# Patient Record
Sex: FEMALE | Race: WHITE | NOT HISPANIC OR LATINO | Employment: OTHER | URBAN - METROPOLITAN AREA
[De-identification: names, ages, dates, MRNs, and addresses within clinical notes are randomized per-mention and may not be internally consistent; named-entity substitution may affect disease eponyms.]

---

## 2023-10-19 DIAGNOSIS — Z83.79 FAMILY HISTORY OF CELIAC DISEASE: Primary | ICD-10-CM

## 2024-08-16 ENCOUNTER — OFFICE VISIT (OUTPATIENT)
Dept: OBSTETRICS AND GYNECOLOGY | Facility: CLINIC | Age: 49
End: 2024-08-16
Payer: COMMERCIAL

## 2024-08-16 DIAGNOSIS — N93.9 ABNORMAL UTERINE BLEEDING (AUB): ICD-10-CM

## 2024-08-16 DIAGNOSIS — R10.2 PELVIC PAIN IN FEMALE: ICD-10-CM

## 2024-08-16 DIAGNOSIS — Z12.31 ENCOUNTER FOR SCREENING MAMMOGRAM FOR BREAST CANCER: ICD-10-CM

## 2024-08-16 DIAGNOSIS — Z01.419 ENCOUNTER FOR ANNUAL ROUTINE GYNECOLOGICAL EXAMINATION: Primary | ICD-10-CM

## 2024-08-16 LAB
POC BLOOD, URINE: NEGATIVE
POC GLUCOSE, URINE: NEGATIVE MG/DL
POC KETONES, URINE: NEGATIVE MG/DL
POC LEUKOCYTES, URINE: NEGATIVE
POC NITRITE,URINE: NEGATIVE
POC PROTEIN, URINE: NEGATIVE MG/DL

## 2024-08-16 PROCEDURE — 99386 PREV VISIT NEW AGE 40-64: CPT | Performed by: OBSTETRICS & GYNECOLOGY

## 2024-08-16 PROCEDURE — 81003 URINALYSIS AUTO W/O SCOPE: CPT | Mod: QW | Performed by: OBSTETRICS & GYNECOLOGY

## 2024-08-16 ASSESSMENT — ENCOUNTER SYMPTOMS
FATIGUE: 1
PALPITATIONS: 1
OCCASIONAL FEELINGS OF UNSTEADINESS: 0
SINUS PRESSURE: 1
EYES NEGATIVE: 0
DIFFICULTY URINATING: 1
LOSS OF SENSATION IN FEET: 0
HEMATOLOGIC/LYMPHATIC NEGATIVE: 0
ENDOCRINE NEGATIVE: 0
MUSCULOSKELETAL NEGATIVE: 0
GASTROINTESTINAL NEGATIVE: 0
PSYCHIATRIC NEGATIVE: 0
CARDIOVASCULAR NEGATIVE: 0
DEPRESSION: 0
CONSTITUTIONAL NEGATIVE: 0
ALLERGIC/IMMUNOLOGIC NEGATIVE: 0
NEUROLOGICAL NEGATIVE: 0
RESPIRATORY NEGATIVE: 0

## 2024-08-16 ASSESSMENT — LIFESTYLE VARIABLES
HOW MANY STANDARD DRINKS CONTAINING ALCOHOL DO YOU HAVE ON A TYPICAL DAY: PATIENT DOES NOT DRINK
SKIP TO QUESTIONS 9-10: 1
HOW OFTEN DO YOU HAVE SIX OR MORE DRINKS ON ONE OCCASION: NEVER
HOW OFTEN DO YOU HAVE A DRINK CONTAINING ALCOHOL: NEVER
AUDIT-C TOTAL SCORE: 0

## 2024-08-16 NOTE — PROGRESS NOTES
Subjective   Patient ID: Chica Cavazos is a 48 y.o. female who presents for New Patient Visit (New patient visit/annual/heavy periods/pelvic floor issues 5/22/15 wnl last mammogram 22 benign).  HPI  Patient is a 48-year-old female  2 para 2 known to the practice from .  Now here to reestablish care.  She complains of increasing menstrual flow.  Still coming monthly lasting 10 days with 3 to 4 days of heavy flow with clots difficult to manage.    Also complaining of some urinary urgency and difficulty releasing her urine on occasion.    She did reach out to an online medical site and is using a vaginal estradiol 2-3 times per week.  Also using transdermal cream of biased and progesterone daily.    She denies any bowel symptoms.  Review of Systems   Constitutional:  Positive for fatigue.   HENT:  Positive for sinus pressure.    Cardiovascular:  Positive for palpitations.   Genitourinary:  Positive for difficulty urinating, menstrual problem, pelvic pain and urgency.   Hot flashes    Objective   Physical Exam  Thyroid: No thyroid megaly    Cardiovascular: Regular rate and rhythm    Lungs: Clear to auscultation    Breasts: No skin changes no masses palpated    Abdomen: Soft nontender bowel sounds positive no masses palpated    Extremities nontender no edema    Pelvic exam: External genitalia [place]'s urethra and [place]'s are normal.  Vaginal exam shows no lesions or discharge.  Pelvic bimanual exam reveals uterus is slightly bulky 6 weeks size no adnexal masses.  Pelvic floor tenderness.  Assessment/Plan   Heavy menstrual flow.  Will check CBC, FSH, ultrasound.  Follow-up in 2 weeks to review results.  Did review possible solutions such as hormonal control or IUD, D&C ablation, hysterectomy, will discuss further at her follow-up appointment    Pelvic floor pain and urinary urgency was also difficult delayed micturition    Pap smear performed    Mammogram ordered    She will continue her vaginal  estrogen for urinary symptoms.  She continues her transdermal combined hormone replacement prescribed from outside agency and will review at her follow-up visit.         Albaro Rodriguez MD 08/16/24 10:43 AM

## 2024-08-22 ENCOUNTER — HOSPITAL ENCOUNTER (OUTPATIENT)
Dept: RADIOLOGY | Facility: HOSPITAL | Age: 49
Discharge: HOME | End: 2024-08-22
Payer: COMMERCIAL

## 2024-08-22 DIAGNOSIS — N93.9 ABNORMAL UTERINE BLEEDING (AUB): ICD-10-CM

## 2024-08-22 PROCEDURE — 76856 US EXAM PELVIC COMPLETE: CPT

## 2024-08-26 ENCOUNTER — PREP FOR PROCEDURE (OUTPATIENT)
Dept: OBSTETRICS AND GYNECOLOGY | Facility: CLINIC | Age: 49
End: 2024-08-26
Payer: COMMERCIAL

## 2024-08-26 DIAGNOSIS — N84.0 ABNORMAL UTERINE BLEEDING DUE TO ENDOMETRIAL POLYP: Primary | ICD-10-CM

## 2024-08-26 DIAGNOSIS — N93.9 ABNORMAL UTERINE BLEEDING DUE TO ENDOMETRIAL POLYP: Primary | ICD-10-CM

## 2024-08-26 RX ORDER — CELECOXIB 400 MG/1
400 CAPSULE ORAL ONCE
OUTPATIENT
Start: 2024-08-26 | End: 2024-08-26

## 2024-08-26 RX ORDER — ACETAMINOPHEN 325 MG/1
975 TABLET ORAL ONCE
OUTPATIENT
Start: 2024-08-26 | End: 2024-08-26

## 2024-08-26 RX ORDER — SODIUM CHLORIDE, SODIUM LACTATE, POTASSIUM CHLORIDE, CALCIUM CHLORIDE 600; 310; 30; 20 MG/100ML; MG/100ML; MG/100ML; MG/100ML
20 INJECTION, SOLUTION INTRAVENOUS CONTINUOUS
OUTPATIENT
Start: 2024-08-26

## 2024-08-26 NOTE — PROGRESS NOTES
Reviewed ultrasound result with patient.  Abnormal uterine bleeding on hormone replacement therapy with probable endometrial polyp on ultrasound  Discussed options and will proceed with a D&C hysteroscopy endometrial ablation  Surgery submitted

## 2024-08-30 ENCOUNTER — HOSPITAL ENCOUNTER (OUTPATIENT)
Dept: RADIOLOGY | Facility: CLINIC | Age: 49
Discharge: HOME | End: 2024-08-30
Payer: COMMERCIAL

## 2024-08-30 DIAGNOSIS — Z12.31 ENCOUNTER FOR SCREENING MAMMOGRAM FOR BREAST CANCER: ICD-10-CM

## 2024-08-30 PROCEDURE — 77067 SCR MAMMO BI INCL CAD: CPT

## 2024-09-03 LAB
CYTOLOGY CMNT CVX/VAG CYTO-IMP: NORMAL
HPV HR 12 DNA GENITAL QL NAA+PROBE: NEGATIVE
HPV HR GENOTYPES PNL CVX NAA+PROBE: NEGATIVE
HPV16 DNA SPEC QL NAA+PROBE: NEGATIVE
HPV18 DNA SPEC QL NAA+PROBE: NEGATIVE
LAB AP HPV GENOTYPE QUESTION: YES
LAB AP HPV HR: NORMAL
LABORATORY COMMENT REPORT: NORMAL
PATH REPORT.TOTAL CANCER: NORMAL

## 2024-09-04 PROBLEM — N84.0 ABNORMAL UTERINE BLEEDING DUE TO ENDOMETRIAL POLYP: Status: ACTIVE | Noted: 2024-08-26

## 2024-09-04 PROBLEM — N93.9 ABNORMAL UTERINE BLEEDING DUE TO ENDOMETRIAL POLYP: Status: ACTIVE | Noted: 2024-08-26

## 2024-09-06 ENCOUNTER — LAB (OUTPATIENT)
Dept: LAB | Facility: LAB | Age: 49
End: 2024-09-06
Payer: COMMERCIAL

## 2024-09-06 DIAGNOSIS — N93.9 ABNORMAL UTERINE BLEEDING (AUB): ICD-10-CM

## 2024-09-06 DIAGNOSIS — Z83.79 FAMILY HISTORY OF CELIAC DISEASE: ICD-10-CM

## 2024-09-06 LAB
ERYTHROCYTE [DISTWIDTH] IN BLOOD BY AUTOMATED COUNT: 12.8 % (ref 11.5–14.5)
FSH SERPL-ACNC: 5 IU/L
HCT VFR BLD AUTO: 38.5 % (ref 36–46)
HGB BLD-MCNC: 12.3 G/DL (ref 12–16)
IGA SERPL-MCNC: 112 MG/DL (ref 70–400)
LH SERPL-ACNC: 4 IU/L
MCH RBC QN AUTO: 30.1 PG (ref 26–34)
MCHC RBC AUTO-ENTMCNC: 31.9 G/DL (ref 32–36)
MCV RBC AUTO: 94 FL (ref 80–100)
NRBC BLD-RTO: 0 /100 WBCS (ref 0–0)
PLATELET # BLD AUTO: 330 X10*3/UL (ref 150–450)
RBC # BLD AUTO: 4.09 X10*6/UL (ref 4–5.2)
TTG IGA SER IA-ACNC: >250 U/ML
WBC # BLD AUTO: 6 X10*3/UL (ref 4.4–11.3)

## 2024-09-06 PROCEDURE — 83516 IMMUNOASSAY NONANTIBODY: CPT

## 2024-09-06 PROCEDURE — 36415 COLL VENOUS BLD VENIPUNCTURE: CPT

## 2024-09-06 PROCEDURE — 83002 ASSAY OF GONADOTROPIN (LH): CPT

## 2024-09-06 PROCEDURE — 85027 COMPLETE CBC AUTOMATED: CPT

## 2024-09-06 PROCEDURE — 82784 ASSAY IGA/IGD/IGG/IGM EACH: CPT

## 2024-09-06 PROCEDURE — 83001 ASSAY OF GONADOTROPIN (FSH): CPT

## 2024-09-12 ENCOUNTER — HOSPITAL ENCOUNTER (OUTPATIENT)
Dept: RADIOLOGY | Facility: EXTERNAL LOCATION | Age: 49
Discharge: HOME | End: 2024-09-12

## 2024-09-13 ENCOUNTER — APPOINTMENT (OUTPATIENT)
Dept: OBSTETRICS AND GYNECOLOGY | Facility: CLINIC | Age: 49
End: 2024-09-13
Payer: COMMERCIAL

## 2024-09-13 DIAGNOSIS — R76.8 ELEVATED ANTI-TISSUE TRANSGLUTAMINASE (TTG) IGA LEVEL: Primary | ICD-10-CM

## 2024-10-21 ENCOUNTER — ANESTHESIA EVENT (OUTPATIENT)
Dept: OPERATING ROOM | Facility: HOSPITAL | Age: 49
End: 2024-10-21
Payer: COMMERCIAL

## 2024-10-22 ENCOUNTER — HOSPITAL ENCOUNTER (OUTPATIENT)
Facility: HOSPITAL | Age: 49
Setting detail: OUTPATIENT SURGERY
Discharge: HOME | End: 2024-10-22
Attending: OBSTETRICS & GYNECOLOGY | Admitting: OBSTETRICS & GYNECOLOGY
Payer: COMMERCIAL

## 2024-10-22 ENCOUNTER — ANESTHESIA (OUTPATIENT)
Dept: OPERATING ROOM | Facility: HOSPITAL | Age: 49
End: 2024-10-22
Payer: COMMERCIAL

## 2024-10-22 VITALS
WEIGHT: 139.11 LBS | HEART RATE: 61 BPM | BODY MASS INDEX: 24.65 KG/M2 | OXYGEN SATURATION: 100 % | DIASTOLIC BLOOD PRESSURE: 57 MMHG | RESPIRATION RATE: 14 BRPM | HEIGHT: 63 IN | TEMPERATURE: 97.5 F | SYSTOLIC BLOOD PRESSURE: 102 MMHG

## 2024-10-22 DIAGNOSIS — N84.0 ABNORMAL UTERINE BLEEDING DUE TO ENDOMETRIAL POLYP: ICD-10-CM

## 2024-10-22 DIAGNOSIS — N93.9 ABNORMAL UTERINE BLEEDING DUE TO ENDOMETRIAL POLYP: ICD-10-CM

## 2024-10-22 LAB — PREGNANCY TEST URINE, POC: NEGATIVE

## 2024-10-22 PROCEDURE — 81025 URINE PREGNANCY TEST: CPT | Performed by: OBSTETRICS & GYNECOLOGY

## 2024-10-22 PROCEDURE — A58563 PR HYSTEROSCOPY,W/ENDOMETRIAL ABLATION: Performed by: ANESTHESIOLOGY

## 2024-10-22 PROCEDURE — 7100000001 HC RECOVERY ROOM TIME - INITIAL BASE CHARGE: Performed by: OBSTETRICS & GYNECOLOGY

## 2024-10-22 PROCEDURE — 2500000005 HC RX 250 GENERAL PHARMACY W/O HCPCS: Performed by: OBSTETRICS & GYNECOLOGY

## 2024-10-22 PROCEDURE — 3700000002 HC GENERAL ANESTHESIA TIME - EACH INCREMENTAL 1 MINUTE: Performed by: OBSTETRICS & GYNECOLOGY

## 2024-10-22 PROCEDURE — 3700000001 HC GENERAL ANESTHESIA TIME - INITIAL BASE CHARGE: Performed by: OBSTETRICS & GYNECOLOGY

## 2024-10-22 PROCEDURE — 2500000004 HC RX 250 GENERAL PHARMACY W/ HCPCS (ALT 636 FOR OP/ED): Performed by: OBSTETRICS & GYNECOLOGY

## 2024-10-22 PROCEDURE — 2720000007 HC OR 272 NO HCPCS: Performed by: OBSTETRICS & GYNECOLOGY

## 2024-10-22 PROCEDURE — 2500000001 HC RX 250 WO HCPCS SELF ADMINISTERED DRUGS (ALT 637 FOR MEDICARE OP): Performed by: OBSTETRICS & GYNECOLOGY

## 2024-10-22 PROCEDURE — 2500000004 HC RX 250 GENERAL PHARMACY W/ HCPCS (ALT 636 FOR OP/ED): Performed by: REGISTERED NURSE

## 2024-10-22 PROCEDURE — 7100000002 HC RECOVERY ROOM TIME - EACH INCREMENTAL 1 MINUTE: Performed by: OBSTETRICS & GYNECOLOGY

## 2024-10-22 PROCEDURE — 3600000008 HC OR TIME - EACH INCREMENTAL 1 MINUTE - PROCEDURE LEVEL THREE: Performed by: OBSTETRICS & GYNECOLOGY

## 2024-10-22 PROCEDURE — 7100000010 HC PHASE TWO TIME - EACH INCREMENTAL 1 MINUTE: Performed by: OBSTETRICS & GYNECOLOGY

## 2024-10-22 PROCEDURE — 7100000009 HC PHASE TWO TIME - INITIAL BASE CHARGE: Performed by: OBSTETRICS & GYNECOLOGY

## 2024-10-22 PROCEDURE — 3600000003 HC OR TIME - INITIAL BASE CHARGE - PROCEDURE LEVEL THREE: Performed by: OBSTETRICS & GYNECOLOGY

## 2024-10-22 PROCEDURE — 58563 HYSTEROSCOPY ABLATION: CPT | Performed by: OBSTETRICS & GYNECOLOGY

## 2024-10-22 RX ORDER — SODIUM CHLORIDE, SODIUM LACTATE, POTASSIUM CHLORIDE, CALCIUM CHLORIDE 600; 310; 30; 20 MG/100ML; MG/100ML; MG/100ML; MG/100ML
50 INJECTION, SOLUTION INTRAVENOUS CONTINUOUS
Status: DISCONTINUED | OUTPATIENT
Start: 2024-10-22 | End: 2024-10-22 | Stop reason: HOSPADM

## 2024-10-22 RX ORDER — ACETAMINOPHEN 325 MG/1
975 TABLET ORAL ONCE
Status: COMPLETED | OUTPATIENT
Start: 2024-10-22 | End: 2024-10-22

## 2024-10-22 RX ORDER — CELECOXIB 200 MG/1
400 CAPSULE ORAL ONCE
Status: DISCONTINUED | OUTPATIENT
Start: 2024-10-22 | End: 2024-10-22 | Stop reason: HOSPADM

## 2024-10-22 RX ORDER — LIDOCAINE HYDROCHLORIDE 20 MG/ML
INJECTION, SOLUTION EPIDURAL; INFILTRATION; INTRACAUDAL; PERINEURAL AS NEEDED
Status: DISCONTINUED | OUTPATIENT
Start: 2024-10-22 | End: 2024-10-22

## 2024-10-22 RX ORDER — RIMEGEPANT SULFATE 75 MG/75MG
75 TABLET, ORALLY DISINTEGRATING ORAL AS NEEDED
COMMUNITY

## 2024-10-22 RX ORDER — FENTANYL CITRATE 50 UG/ML
INJECTION, SOLUTION INTRAMUSCULAR; INTRAVENOUS AS NEEDED
Status: DISCONTINUED | OUTPATIENT
Start: 2024-10-22 | End: 2024-10-22

## 2024-10-22 RX ORDER — MIDAZOLAM HYDROCHLORIDE 1 MG/ML
INJECTION INTRAMUSCULAR; INTRAVENOUS AS NEEDED
Status: DISCONTINUED | OUTPATIENT
Start: 2024-10-22 | End: 2024-10-22

## 2024-10-22 RX ORDER — SODIUM CHLORIDE 0.9 G/100ML
IRRIGANT IRRIGATION AS NEEDED
Status: DISCONTINUED | OUTPATIENT
Start: 2024-10-22 | End: 2024-10-22 | Stop reason: HOSPADM

## 2024-10-22 RX ORDER — ONDANSETRON HYDROCHLORIDE 2 MG/ML
INJECTION, SOLUTION INTRAVENOUS AS NEEDED
Status: DISCONTINUED | OUTPATIENT
Start: 2024-10-22 | End: 2024-10-22

## 2024-10-22 RX ORDER — PROPOFOL 10 MG/ML
INJECTION, EMULSION INTRAVENOUS AS NEEDED
Status: DISCONTINUED | OUTPATIENT
Start: 2024-10-22 | End: 2024-10-22

## 2024-10-22 RX ORDER — PHENYLEPHRINE HCL IN 0.9% NACL 1 MG/10 ML
SYRINGE (ML) INTRAVENOUS AS NEEDED
Status: DISCONTINUED | OUTPATIENT
Start: 2024-10-22 | End: 2024-10-22

## 2024-10-22 RX ORDER — LIDOCAINE HYDROCHLORIDE 10 MG/ML
0.1 INJECTION, SOLUTION EPIDURAL; INFILTRATION; INTRACAUDAL; PERINEURAL ONCE
Status: DISCONTINUED | OUTPATIENT
Start: 2024-10-22 | End: 2024-10-22 | Stop reason: HOSPADM

## 2024-10-22 RX ORDER — SODIUM CHLORIDE, SODIUM LACTATE, POTASSIUM CHLORIDE, CALCIUM CHLORIDE 600; 310; 30; 20 MG/100ML; MG/100ML; MG/100ML; MG/100ML
20 INJECTION, SOLUTION INTRAVENOUS CONTINUOUS
Status: DISCONTINUED | OUTPATIENT
Start: 2024-10-22 | End: 2024-10-22 | Stop reason: HOSPADM

## 2024-10-22 RX ORDER — PROGESTERONE 200 MG/1
200 CAPSULE ORAL NIGHTLY
COMMUNITY
Start: 2024-02-04 | End: 2024-10-22 | Stop reason: HOSPADM

## 2024-10-22 RX ORDER — KETOROLAC TROMETHAMINE 30 MG/ML
INJECTION, SOLUTION INTRAMUSCULAR; INTRAVENOUS AS NEEDED
Status: DISCONTINUED | OUTPATIENT
Start: 2024-10-22 | End: 2024-10-22

## 2024-10-22 RX ORDER — OXYCODONE HYDROCHLORIDE 5 MG/1
5 TABLET ORAL EVERY 4 HOURS PRN
Status: DISCONTINUED | OUTPATIENT
Start: 2024-10-22 | End: 2024-10-22 | Stop reason: HOSPADM

## 2024-10-22 RX ORDER — ACETAMINOPHEN 325 MG/1
650 TABLET ORAL EVERY 4 HOURS PRN
Status: DISCONTINUED | OUTPATIENT
Start: 2024-10-22 | End: 2024-10-22 | Stop reason: HOSPADM

## 2024-10-22 SDOH — HEALTH STABILITY: MENTAL HEALTH: CURRENT SMOKER: 0

## 2024-10-22 ASSESSMENT — COLUMBIA-SUICIDE SEVERITY RATING SCALE - C-SSRS
6. HAVE YOU EVER DONE ANYTHING, STARTED TO DO ANYTHING, OR PREPARED TO DO ANYTHING TO END YOUR LIFE?: NO
1. IN THE PAST MONTH, HAVE YOU WISHED YOU WERE DEAD OR WISHED YOU COULD GO TO SLEEP AND NOT WAKE UP?: NO
2. HAVE YOU ACTUALLY HAD ANY THOUGHTS OF KILLING YOURSELF?: NO

## 2024-10-22 ASSESSMENT — PAIN SCALES - GENERAL
PAINLEVEL_OUTOF10: 2
PAINLEVEL_OUTOF10: 0 - NO PAIN
PAINLEVEL_OUTOF10: 0 - NO PAIN
PAINLEVEL_OUTOF10: 2

## 2024-10-22 ASSESSMENT — PAIN - FUNCTIONAL ASSESSMENT
PAIN_FUNCTIONAL_ASSESSMENT: 0-10
PAIN_FUNCTIONAL_ASSESSMENT: UNABLE TO SELF-REPORT
PAIN_FUNCTIONAL_ASSESSMENT: 0-10

## 2024-10-22 ASSESSMENT — PAIN DESCRIPTION - DESCRIPTORS
DESCRIPTORS: CRAMPING

## 2024-10-22 NOTE — ANESTHESIA POSTPROCEDURE EVALUATION
Patient: Chica Cavazos    Procedure Summary       Date: 10/22/24 Room / Location: [facility] A OR 06 / Virtual U A OR    Anesthesia Start: 0829 Anesthesia Stop: 0919    Procedure: Hysteroscopy; D & C; Novasure Tissue Ablation (Pelvis) Diagnosis:       Abnormal uterine bleeding due to endometrial polyp      (Abnormal uterine bleeding due to endometrial polyp [N93.9, N84.0])    Surgeons: Albaro Rodriguez MD Responsible Provider: Ty Cortes MD    Anesthesia Type: general ASA Status: 2            Anesthesia Type: general    Vitals Value Taken Time   /57 10/22/24 0955   Temp 36.7 °C (98.1 °F) 10/22/24 0915   Pulse 58 10/22/24 0955   Resp 12 10/22/24 0945   SpO2 100 % 10/22/24 0956   Vitals shown include unfiled device data.    Anesthesia Post Evaluation    Patient location during evaluation: PACU  Patient participation: complete - patient participated  Level of consciousness: awake  Pain management: adequate  Airway patency: patent  Cardiovascular status: acceptable  Respiratory status: acceptable  Hydration status: acceptable  Postoperative Nausea and Vomiting: none        No notable events documented.

## 2024-10-22 NOTE — PERIOPERATIVE NURSING NOTE
1000 assuming care of pt at this time    1005 pt spouse at bedside    1010 Discharge instructions discussed with patient and family at this time verbalized understanding     1015 pt dressing w assist of spouse at bedside    1019 Patient Discharged in stable condition via wheelchair to [facility]

## 2024-10-22 NOTE — OP NOTE
Hysteroscopy; D & C; Novasure Tissue Ablation Operative Note     Date: 10/22/2024  OR Location: [facility] A OR    Name: Chica Cavazos, : 1975, Age: 48 y.o., MRN: [id number], Sex: female    Diagnosis  Pre-op Diagnosis      * Abnormal uterine bleeding due to endometrial polyp [N93.9, N84.0] Post-op Diagnosis     * Abnormal uterine bleeding due to endometrial polyp [N93.9, N84.0]     Procedures  Hysteroscopy; D & C; Novasure Tissue Ablation  37706 - NV HYSTEROSCOPY ENDOMETRIAL ABLATION      Surgeons      * Albaro Rodriguez - Primary    Resident/Fellow/Other Assistant:  Surgeons and Role:  * No surgeons found with a matching role *    Procedure Summary  Anesthesia: General  ASA: II  Anesthesia Staff: Anesthesiologist: Ty Cortes MD  CRNA: CAROL Espinosa-CRNA, DNAP  Estimated Blood Loss: 5 1 down due to yellow and not just for the patient so once that goes on the chart that goes home with her to just make sure they know to do that yeah mL  Intra-op Medications:   Administrations occurring from 0830 to 0945 on 10/22/24:   Medication Name Total Dose   sodium chloride 0.9 % irrigation solution 1,000 mL   dexAMETHasone (Decadron) injection 4 mg/mL 8 mg   fentaNYL (Sublimaze) injection 50 mcg/mL 100 mcg   ketorolac (Toradol) injection 30 mg 30 mg   lactated Ringer's infusion Cannot be calculated   lidocaine PF (Xylocaine-MPF) local injection 2 % 100 mg   midazolam PF (Versed) injection 1 mg/mL 1 mg   ondansetron (Zofran) 2 mg/mL injection 4 mg   phenylephrine 100 mcg/mL syringe 10 mL (prefilled) 450 mcg   propofol (Diprivan) injection 10 mg/mL 230 mg              Anesthesia Record               Intraprocedure I/O Totals          Intake    lactated Ringer's infusion 700.00 mL    Total Intake 700 mL       Output    Urine 0 mL    Total Blood Loss - Surgical Delivery (mL) 5 mL    Total Output 5 mL       Net    Net Volume 695 mL       Other (could not be determined as input or output)    Surgical Delivery Estimated  Blood Loss (mL) 5          Specimen:   ID Type Source Tests Collected by Time   1 : ENDOMETRIAL CURETTINGS Tissue ENDOMETRIUM CURETTINGS SURGICAL PATHOLOGY EXAM Albaro Rodriguez MD 10/22/2024 0867        Staff:   Circulator: Jessica Lee Person: Yazmin  Circulator: Ashlie         Drains and/or Catheters: * None in log *    Tourniquet Times:         Implants:     Findings: Sounded to 9 cm.  No submucous fibroids.  1 endometrial polyp    Indications: Chica Cavazos is an 48 y.o. female who is having surgery for Abnormal uterine bleeding due to endometrial polyp [N93.9, N84.0].     The patient was seen in the preoperative area. The risks, benefits, complications, treatment options, non-operative alternatives, expected recovery and outcomes were discussed with the patient. The possibilities of reaction to medication, pulmonary aspiration, injury to surrounding structures, bleeding, recurrent infection, the need for additional procedures, failure to diagnose a condition, and creating a complication requiring transfusion or operation were discussed with the patient. The patient concurred with the proposed plan, giving informed consent.  The site of surgery was properly noted/marked if necessary per policy. The patient has been actively warmed in preoperative area. Preoperative antibiotics are not indicated. Venous thrombosis prophylaxis have been ordered including bilateral sequential compression devices    Procedure Details:   Patient taken the operating room and after given general anesthesia placed into the dorsolithotomy position and prepped and draped in usual fashion.  Red rubber catheter was used to drain the bladder approximately 100 cc of urine.  Weighted speculum placed into the posterior fornix of the vagina and a [place] retractor was used to visualize the cervix.  Double-tooth tenaculum placed onto the anterior lip of the cervix.  Uterus is sounded to 9 cm serially dilated up and a hysteroscope was  inserted.  The both ostia were visualized.  The endometrium overall appeared atrophic other than a polyp on the posterior surface.  There did seem to be a intramural fibroid distorting the cavity at the fundus.  Cervical canal was normal.  Sharp curettings were done throughout 3 and 6 degrees and across the fundus and all sent as a single specimen.  A NovaSure ablation device then deployed within the uterus at 6 cm depth and 4.4 cm width and fired for 38 seconds.  Hysteroscope was reinserted and excellent result was noted.  Instrument drawn from the vagina patient taken out of the dorsolithotomy position awoken transferred covering stable condition        Complications:  None; patient tolerated the procedure well.    Disposition: PACU - hemodynamically stable.  Condition: stable         Additional Details: Endometrial curettings to the lab    Attending Attestation:     Albaro Rodriguez  Phone Number: [phone]

## 2024-10-22 NOTE — ANESTHESIA PROCEDURE NOTES
Airway  Date/Time: 10/22/2024 8:42 AM  Urgency: elective    Airway not difficult    Staffing  Performed: CRNA   Authorized by: Ty Cortes MD    Performed by: CAROL Espinosa-CRNA, DNAP  Patient location during procedure: OR    Indications and Patient Condition  Indications for airway management: anesthesia and airway protection  Spontaneous ventilation: present  Sedation level: deep  Preoxygenated: yes  Patient position: sniffing  MILS not maintained throughout  Mask difficulty assessment: 0 - not attempted    Final Airway Details  Final airway type: supraglottic airway      Successful airway: Supraglottic airway: igel.  Size 4     Number of attempts at approach: 1  Ventilation between attempts: none  Number of other approaches attempted: 0    Additional Comments  LMA inserted into mouth with ease. No change in dentition, no complications.

## 2024-10-22 NOTE — ANESTHESIA PREPROCEDURE EVALUATION
"Patient: Chica Cavazos    Procedure Information       Date/Time: 10/22/24 0830    Procedure: Hysteroscopy; D & C; Novasure Tissue Ablation (Pelvis)    Location: [facility] A OR 06 / Virtual [facility] A OR    Surgeons: Albaro Rodriguez MD            Relevant Problems   GYN   (+) Abnormal uterine bleeding due to endometrial polyp       Clinical information reviewed:                    No past medical history on file.   No past surgical history on file.      No current outpatient medications   Allergies   Allergen Reactions    Sulfa (Sulfonamide Antibiotics) Rash        Chemistry    No results found for: \"NA\", \"K\", \"CL\", \"CO2\", \"BUN\", \"CREATININE\", \"GLU\" No results found for: \"CALCIUM\", \"ALKPHOS\", \"AST\", \"ALT\", \"BILITOT\"       No results found for: \"HGBA1C\"  Lab Results   Component Value Date/Time    WBC 6.0 09/06/2024 0957    HGB 12.3 09/06/2024 0957    HCT 38.5 09/06/2024 0957     09/06/2024 0957     No results found for: \"PROTIME\", \"PTT\", \"INR\"  No results found for: \"ABORH\"  No results found for this or any previous visit (from the past 4464 hours).  No results found for this or any previous visit from the past 1095 days.       Visit Vitals  OB Status Having periods     No data recorded    Physical Exam    Airway  Mallampati: II  TM distance: >3 FB  Neck ROM: full     Cardiovascular - normal exam     Dental - normal exam     Pulmonary - normal exam     Abdominal - normal exam             Anesthesia Plan    History of general anesthesia?: no  History of complications of general anesthesia?: no    ASA 2     general     The patient is not a current smoker.    intravenous induction   Postoperative administration of opioids is intended.  Anesthetic plan and risks discussed with patient.  Use of blood products discussed with patient who.    Plan discussed with CRNA, CHAYITO and attending.        "

## 2024-10-22 NOTE — H&P
"History Of Present Illness  Chica Cavazos is a 48 y.o. female presenting with IP GYN HPI/REASON FOR CONSULT: AUB/Consult .     Past Medical History  She has a past medical history of ADHD and Depression.    Surgical History  She has a past surgical history that includes BI US PERC needle core breast biopsy.     OB History  OB History    Para Term  AB Living   3 3           SAB IAB Ectopic Multiple Live Births                  # Outcome Date GA Lbr Andrew/2nd Weight Sex Type Anes PTL Lv   3 Para            2 Para            1 Para                Sexual History  Social History     Substance and Sexual Activity   Sexual Activity Not on file        Social History  She reports that she has never smoked. She has never used smokeless tobacco. She reports that she does not currently use alcohol. She reports that she does not currently use drugs.    Family History  No family history on file.     Allergies  Sulfa (sulfonamide antibiotics)    Review of Systems     Physical Exam     Last Recorded Vitals  Blood pressure 114/73, pulse 84, temperature 36.7 °C (98.1 °F), temperature source Temporal, resp. rate 14, height 1.6 m (5' 3\"), weight 63.1 kg (139 lb 1.8 oz), last menstrual period 2024, SpO2 100%.    Relevant Results  Medications    Current Facility-Administered Medications:     celecoxib (CeleBREX) capsule 400 mg, 400 mg, oral, Once, Albaro Rodriguez MD    lactated Ringer's infusion, 20 mL/hr, intravenous, Continuous, Albaro Rodriguez MD    Labs  CBC  No results found for: \"WBC\", \"NRBC\", \"RBC\", \"HGB\", \"HCT\", \"MCV\", \"MCH\", \"MCHC\", \"RDW\", \"PLT\", \"MPV\"    CMP  No results found for: \"GLUCOSE\", \"NA\", \"K\", \"CL\", \"CO2\", \"ANIONGAP\", \"BUN\", \"CREATININE\", \"EGFR\", \"CALCIUM\", \"ALBUMIN\", \"ALKPHOS\", \"PROT\", \"AST\", \"BILITOT\", \"ALT\"    Coagulation   No results found for: \"PROTIME\", \"INR\", \"APTT\", \"FIBRINOGEN\"    Pregnancy Tests  No results found for: \"HCGQUANT\", \"HCGURINE\"    Imaging   Results for orders placed during " the hospital encounter of 24    US PELVIS TRANSABDOMINAL WITH TRANSVAGINAL    Narrative  Interpreted By:  Daryl Booker,  STUDY:  US PELVIS TRANSABDOMINAL WITH TRANSVAGINAL;  2024 11:35 am    INDICATION:  Signs/Symptoms:Meonrrhagia.    COMPARISON:  None.    ACCESSION NUMBER(S):  [id number]    ORDERING CLINICIAN:  SHEMAR CUMMINGS    TECHNIQUE:  Multiple multiplanar static gray scale, color and spectral waveform  sonographic images of the pelvis were obtained. Transabdominal and  endovaginal ultrasound was performed.    FINDINGS:  UTERUS:  The uterus measures  8.0 x 6.1 x 10.0.  Multiple fibroids are seen.  Fundal fibroid measuring 3.5 x 3.1 cm. Lower uterine segment fibroid  measures 3 x 2.7 cm. A 3rd posterior fibroid detected measuring 1.7  cm.    ENDOMETRIUM:  The endometrium measures a thickness of 1.3 CM. Apparent endometrial  polyp detected measuring about 1.3 x 1.2 cm.    RIGHT not well seen    LEFT ADNEXA:  The left ovary measures 1.7 x 2.3 x 3.3 and demonstrates normal flow.  No gross left adnexal masses are seen, no hydrosalpinx. Corpus luteum  cyst detected up to 1.7 cm.    Impression  1. Findings concerning for endometrial polyp measuring up to 1.3 cm  on background myomatous uterus. Further evaluation advised    MACRO:  Critical Finding:  See findings. Notification was initiated on  2024 at 7:30 am by  Daryl Booker.  (**-YCF-**) Instructions:    Signed by: Daryl Booker 2024 7:31 AM  Dictation workstation:   [id number]    HPI  Patient is a 48-year-old female  2 para 2 known to the practice from .  Now here to reestablish care.  She complains of increasing menstrual flow.  Still coming monthly lasting 10 days with 3 to 4 days of heavy flow with clots difficult to manage.     Assessment/Plan   Assessment & Plan  Abnormal uterine bleeding due to endometrial polyp      D&C hysteroscopy and endometrial ablation       I spent 30 minutes in the professional and overall care  of this patient.      Albaro Rodriguez MD

## 2024-10-22 NOTE — DISCHARGE INSTRUCTIONS
Please call Dr. Rodriguez's Office for:    - Bleeding more than two pads per hour  - Pain not resolved with tylenol/ibuprofen  - Fever >100.4 degrees  - Chest pain/Shortness of breath  - Call 911 if you are experiencing a medical emergencyPlease call Dr. Rodriguez's Office for:    - Bleeding more than two pads per hour  - Pain not resolved with tylenol/ibuprofen  - Fever >100.4 degrees  - Chest pain/Shortness of breath  - Call 911 if you are experiencing a medical emergency

## 2024-10-25 LAB
LABORATORY COMMENT REPORT: NORMAL
PATH REPORT.FINAL DX SPEC: NORMAL
PATH REPORT.GROSS SPEC: NORMAL
PATH REPORT.RELEVANT HX SPEC: NORMAL
PATH REPORT.TOTAL CANCER: NORMAL

## 2024-11-04 ENCOUNTER — TELEPHONE (OUTPATIENT)
Dept: OBSTETRICS AND GYNECOLOGY | Facility: CLINIC | Age: 49
End: 2024-11-04
Payer: COMMERCIAL

## 2024-11-04 NOTE — TELEPHONE ENCOUNTER
Ms Wilder had D&C Hysterscopy/Albation on 10/22- She has post op appointment on 11/11-She is c/o fatique and light bleeding.  She feels like she is just not bouncing back and should be feeling better by now-wants to discuss

## 2024-11-11 ENCOUNTER — OFFICE VISIT (OUTPATIENT)
Dept: OBSTETRICS AND GYNECOLOGY | Facility: CLINIC | Age: 49
End: 2024-11-11
Payer: COMMERCIAL

## 2024-11-11 VITALS
WEIGHT: 137 LBS | SYSTOLIC BLOOD PRESSURE: 114 MMHG | HEIGHT: 63 IN | DIASTOLIC BLOOD PRESSURE: 71 MMHG | BODY MASS INDEX: 24.27 KG/M2

## 2024-11-11 DIAGNOSIS — N95.1 MENOPAUSAL SYMPTOMS: Primary | ICD-10-CM

## 2024-11-11 DIAGNOSIS — R23.2 HOT FLASHES: ICD-10-CM

## 2024-11-11 DIAGNOSIS — Z09 POSTOPERATIVE EXAMINATION: ICD-10-CM

## 2024-11-11 PROCEDURE — 99213 OFFICE O/P EST LOW 20 MIN: CPT | Performed by: OBSTETRICS & GYNECOLOGY

## 2024-11-11 PROCEDURE — 3008F BODY MASS INDEX DOCD: CPT | Performed by: OBSTETRICS & GYNECOLOGY

## 2024-11-11 RX ORDER — NITROFURANTOIN 25; 75 MG/1; MG/1
100 CAPSULE ORAL 2 TIMES DAILY
COMMUNITY
Start: 2024-02-06 | End: 2024-02-09

## 2024-11-11 RX ORDER — LIFITEGRAST 50 MG/ML
SOLUTION/ DROPS OPHTHALMIC
COMMUNITY
Start: 2024-08-11

## 2024-11-11 RX ORDER — FLUOXETINE HYDROCHLORIDE 20 MG/1
20 CAPSULE ORAL DAILY
Qty: 30 CAPSULE | Refills: 11 | Status: SHIPPED | OUTPATIENT
Start: 2024-11-11 | End: 2025-11-11

## 2024-11-11 ASSESSMENT — ENCOUNTER SYMPTOMS
ALLERGIC/IMMUNOLOGIC NEGATIVE: 0
NERVOUS/ANXIOUS: 1
CARDIOVASCULAR NEGATIVE: 0
MUSCULOSKELETAL NEGATIVE: 0
FATIGUE: 1
RESPIRATORY NEGATIVE: 0
GASTROINTESTINAL NEGATIVE: 0
EYES NEGATIVE: 0
CONSTITUTIONAL NEGATIVE: 0
DYSPHORIC MOOD: 1
NEUROLOGICAL NEGATIVE: 0
ENDOCRINE NEGATIVE: 0
HEMATOLOGIC/LYMPHATIC NEGATIVE: 0
PSYCHIATRIC NEGATIVE: 0
SLEEP DISTURBANCE: 1

## 2024-11-11 ASSESSMENT — PAIN SCALES - GENERAL: PAINLEVEL_OUTOF10: 0-NO PAIN

## 2024-11-11 NOTE — PROGRESS NOTES
Subjective   Patient ID: Chica Cavazos is a 48 y.o. female who presents for Postop Visit (Postop last pap 8/16/24 wnl HPV negative last mammogram 8/30/24 benign).  CHRIS Coto is a 48-year-old female perimenopausal here for postoperative visit after D&C laparoscopy and endometrial ablation.  Discharge has resolved and she feels well.  She does complain of hot flashes as she was on estrogen replacement therapy from an outside source.  He stopped that medication after the ablation.  Now with hot flashes and poor sleep  Also complains of some increased depression.  She has done well on Prozac in the past  Review of Systems   Constitutional:  Positive for fatigue.   Genitourinary:  Positive for pelvic pain.   Psychiatric/Behavioral:  Positive for dysphoric mood and sleep disturbance. The patient is nervous/anxious.        Objective   Physical Exam  Abdomen is soft nondistended bowel sounds positive no masses palpated nontender    Pelvic exam: External genitalia Bartholin's urethra and [place]'s normal.  Vaginal vault without blood or discharge.  On bimanual exam the uterus is small mobile nontender no adnexal masses  Assessment/Plan   Normal postoperative visit after D&C hysteroscopy and endometrial elation    Discussed the need to continue birth control.  If not using condoms would consider low-dose combined contraception or progesterone only pills.    Continue vaginal estrogen for vaginal health    Will begin Prozac to decrease hot flashes as well as mood stabilizer for history of depression    FSH level to check menopausal status at this time         Albaro Rodriguez MD 11/11/24 3:45 PM

## 2024-11-11 NOTE — TELEPHONE ENCOUNTER
RN returned Patient call, verified by name and   RN apologized for the delay  Patient states that day she called in she had helped rake leaves and had just gotten her flu shot  Patient states she feels much better now  No Moncada RN

## 2024-11-15 ENCOUNTER — LAB (OUTPATIENT)
Dept: LAB | Facility: LAB | Age: 49
End: 2024-11-15
Payer: COMMERCIAL

## 2024-11-15 DIAGNOSIS — N84.0 ABNORMAL UTERINE BLEEDING DUE TO ENDOMETRIAL POLYP: ICD-10-CM

## 2024-11-15 DIAGNOSIS — N95.1 MENOPAUSAL SYMPTOMS: ICD-10-CM

## 2024-11-15 DIAGNOSIS — N93.9 ABNORMAL UTERINE BLEEDING DUE TO ENDOMETRIAL POLYP: ICD-10-CM

## 2024-11-15 LAB
ANION GAP SERPL CALC-SCNC: 14 MMOL/L (ref 10–20)
BUN SERPL-MCNC: 12 MG/DL (ref 6–23)
CALCIUM SERPL-MCNC: 9.6 MG/DL (ref 8.6–10.6)
CHLORIDE SERPL-SCNC: 102 MMOL/L (ref 98–107)
CO2 SERPL-SCNC: 26 MMOL/L (ref 21–32)
CREAT SERPL-MCNC: 0.65 MG/DL (ref 0.5–1.05)
EGFRCR SERPLBLD CKD-EPI 2021: >90 ML/MIN/1.73M*2
ERYTHROCYTE [DISTWIDTH] IN BLOOD BY AUTOMATED COUNT: 12 % (ref 11.5–14.5)
FSH SERPL-ACNC: 8.8 IU/L
GLUCOSE SERPL-MCNC: 93 MG/DL (ref 74–99)
HCT VFR BLD AUTO: 41.1 % (ref 36–46)
HGB BLD-MCNC: 13 G/DL (ref 12–16)
LH SERPL-ACNC: 35.5 IU/L
MCH RBC QN AUTO: 29.5 PG (ref 26–34)
MCHC RBC AUTO-ENTMCNC: 31.6 G/DL (ref 32–36)
MCV RBC AUTO: 93 FL (ref 80–100)
NRBC BLD-RTO: 0 /100 WBCS (ref 0–0)
PLATELET # BLD AUTO: 347 X10*3/UL (ref 150–450)
POTASSIUM SERPL-SCNC: 5.3 MMOL/L (ref 3.5–5.3)
RBC # BLD AUTO: 4.4 X10*6/UL (ref 4–5.2)
SODIUM SERPL-SCNC: 137 MMOL/L (ref 136–145)
WBC # BLD AUTO: 7.1 X10*3/UL (ref 4.4–11.3)

## 2024-11-15 PROCEDURE — 83001 ASSAY OF GONADOTROPIN (FSH): CPT

## 2024-11-15 PROCEDURE — 80048 BASIC METABOLIC PNL TOTAL CA: CPT

## 2024-11-15 PROCEDURE — 83002 ASSAY OF GONADOTROPIN (LH): CPT

## 2024-11-15 PROCEDURE — 36415 COLL VENOUS BLD VENIPUNCTURE: CPT

## 2024-11-15 PROCEDURE — 85027 COMPLETE CBC AUTOMATED: CPT

## 2024-12-13 ENCOUNTER — PATIENT MESSAGE (OUTPATIENT)
Dept: OBSTETRICS AND GYNECOLOGY | Facility: CLINIC | Age: 49
End: 2024-12-13
Payer: COMMERCIAL

## 2024-12-13 DIAGNOSIS — N95.1 MENOPAUSAL SYMPTOMS: Primary | ICD-10-CM

## 2024-12-19 RX ORDER — ESTRADIOL 0.1 MG/G
2 CREAM VAGINAL NIGHTLY
Qty: 34 G | Refills: 3 | Status: SHIPPED | OUTPATIENT
Start: 2024-12-19 | End: 2025-12-19

## 2025-03-07 ENCOUNTER — OFFICE VISIT (OUTPATIENT)
Dept: OBSTETRICS AND GYNECOLOGY | Facility: CLINIC | Age: 50
End: 2025-03-07
Payer: COMMERCIAL

## 2025-03-07 VITALS
WEIGHT: 140 LBS | SYSTOLIC BLOOD PRESSURE: 113 MMHG | BODY MASS INDEX: 24.8 KG/M2 | DIASTOLIC BLOOD PRESSURE: 68 MMHG | HEIGHT: 63 IN

## 2025-03-07 DIAGNOSIS — N95.1 MENOPAUSAL SYMPTOMS: ICD-10-CM

## 2025-03-07 DIAGNOSIS — R23.2 HOT FLASHES: Primary | ICD-10-CM

## 2025-03-07 PROCEDURE — 3008F BODY MASS INDEX DOCD: CPT | Performed by: OBSTETRICS & GYNECOLOGY

## 2025-03-07 PROCEDURE — 99213 OFFICE O/P EST LOW 20 MIN: CPT | Performed by: OBSTETRICS & GYNECOLOGY

## 2025-03-07 ASSESSMENT — PAIN SCALES - GENERAL: PAINLEVEL_OUTOF10: 0-NO PAIN

## 2025-03-07 ASSESSMENT — ENCOUNTER SYMPTOMS
ENDOCRINE NEGATIVE: 0
RESPIRATORY NEGATIVE: 0
GASTROINTESTINAL NEGATIVE: 0
CARDIOVASCULAR NEGATIVE: 0
ALLERGIC/IMMUNOLOGIC NEGATIVE: 0
CONSTITUTIONAL NEGATIVE: 0
NEUROLOGICAL NEGATIVE: 0
EYES NEGATIVE: 0
HEMATOLOGIC/LYMPHATIC NEGATIVE: 0
PSYCHIATRIC NEGATIVE: 0
MUSCULOSKELETAL NEGATIVE: 0

## 2025-03-07 NOTE — PROGRESS NOTES
Subjective   Patient ID: Chica Cavazos is a 49 y.o. female who presents for Follow-up (Patient here to discuss BC and BP check no pain or falls, no compaints or concerns, last pap 8/16/24 wnl HPV negative last mammogram 8/30/24 benign no chaperone needed).  HPI  Patient perimenopausal here for evaluation of low-dose birth control pills and vaginal estrogen for control of perimenopausal symptoms.    She is post D&C ablation and bleeding is scant much improved.    Still with occasional hot flashes but tolerable  Review of Systems    Objective   Physical Exam    Assessment/Plan   Low low estrogen in a continuous fashion and vaginal estradiol     Follow-up for annual exam    Albaro Rodriguez MD 03/07/25 12:38 PM

## 2025-03-11 ENCOUNTER — APPOINTMENT (OUTPATIENT)
Dept: OBSTETRICS AND GYNECOLOGY | Facility: CLINIC | Age: 50
End: 2025-03-11
Payer: COMMERCIAL

## 2025-08-30 DIAGNOSIS — R23.2 HOT FLASHES: ICD-10-CM

## 2025-09-03 RX ORDER — FLUOXETINE 20 MG/1
20 CAPSULE ORAL DAILY
Qty: 90 CAPSULE | Refills: 1 | Status: SHIPPED | OUTPATIENT
Start: 2025-09-03

## (undated) DEVICE — PAD, SANITARY, OBSTETRICAL, W/ADHSV STRIP,11 IN,LF

## (undated) DEVICE — ACCESSORY, FLUID MANAGEMENT, AVETA

## (undated) DEVICE — SOLUTION, SCRUB EXIDINE, 4% CHG, 4 OZ

## (undated) DEVICE — Device

## (undated) DEVICE — GLOVE, SURGICAL, PROTEXIS PI MICRO, 7.5, PF, LF

## (undated) DEVICE — KIT, NOVA SURE, DOMESTIC ADVANCED

## (undated) DEVICE — ACCESSORY, AVETA, WASTE MANAGEMENT WITH CAP

## (undated) DEVICE — BRIEF, CURITY, XLARGE, MESH

## (undated) DEVICE — GOWN, ASTOUND, L